# Patient Record
Sex: FEMALE | Race: WHITE | NOT HISPANIC OR LATINO
[De-identification: names, ages, dates, MRNs, and addresses within clinical notes are randomized per-mention and may not be internally consistent; named-entity substitution may affect disease eponyms.]

---

## 2018-01-10 ENCOUNTER — RESULT REVIEW (OUTPATIENT)
Age: 63
End: 2018-01-10

## 2019-01-16 ENCOUNTER — APPOINTMENT (OUTPATIENT)
Dept: OBGYN | Facility: CLINIC | Age: 64
End: 2019-01-16
Payer: COMMERCIAL

## 2019-01-16 VITALS — SYSTOLIC BLOOD PRESSURE: 130 MMHG | HEIGHT: 64 IN | DIASTOLIC BLOOD PRESSURE: 80 MMHG

## 2019-01-16 DIAGNOSIS — Z86.79 PERSONAL HISTORY OF OTHER DISEASES OF THE CIRCULATORY SYSTEM: ICD-10-CM

## 2019-01-16 DIAGNOSIS — Z80.9 FAMILY HISTORY OF MALIGNANT NEOPLASM, UNSPECIFIED: ICD-10-CM

## 2019-01-16 DIAGNOSIS — F41.9 ANXIETY DISORDER, UNSPECIFIED: ICD-10-CM

## 2019-01-16 DIAGNOSIS — Z78.9 OTHER SPECIFIED HEALTH STATUS: ICD-10-CM

## 2019-01-16 DIAGNOSIS — Z82.0 FAMILY HISTORY OF EPILEPSY AND OTHER DISEASES OF THE NERVOUS SYSTEM: ICD-10-CM

## 2019-01-16 DIAGNOSIS — Z86.59 PERSONAL HISTORY OF OTHER MENTAL AND BEHAVIORAL DISORDERS: ICD-10-CM

## 2019-01-16 DIAGNOSIS — Z80.0 FAMILY HISTORY OF MALIGNANT NEOPLASM OF DIGESTIVE ORGANS: ICD-10-CM

## 2019-01-16 DIAGNOSIS — Z82.49 FAMILY HISTORY OF ISCHEMIC HEART DISEASE AND OTHER DISEASES OF THE CIRCULATORY SYSTEM: ICD-10-CM

## 2019-01-16 DIAGNOSIS — Z87.39 PERSONAL HISTORY OF OTHER DISEASES OF THE MUSCULOSKELETAL SYSTEM AND CONNECTIVE TISSUE: ICD-10-CM

## 2019-01-16 DIAGNOSIS — Z80.3 FAMILY HISTORY OF MALIGNANT NEOPLASM OF BREAST: ICD-10-CM

## 2019-01-16 DIAGNOSIS — Z83.6 FAMILY HISTORY OF OTHER DISEASES OF THE RESPIRATORY SYSTEM: ICD-10-CM

## 2019-01-16 DIAGNOSIS — Z00.00 ENCOUNTER FOR GENERAL ADULT MEDICAL EXAMINATION W/OUT ABNORMAL FINDINGS: ICD-10-CM

## 2019-01-16 DIAGNOSIS — Z83.518 FAMILY HISTORY OF OTHER SPECIFIED EYE DISORDER: ICD-10-CM

## 2019-01-16 PROCEDURE — 99396 PREV VISIT EST AGE 40-64: CPT

## 2019-01-16 RX ORDER — SUMATRIPTAN SUCCINATE 100 MG/1
100 TABLET, FILM COATED ORAL
Refills: 0 | Status: ACTIVE | COMMUNITY

## 2019-03-27 ENCOUNTER — RECORD ABSTRACTING (OUTPATIENT)
Age: 64
End: 2019-03-27

## 2019-03-27 DIAGNOSIS — F32.9 MAJOR DEPRESSIVE DISORDER, SINGLE EPISODE, UNSPECIFIED: ICD-10-CM

## 2019-03-27 LAB — CYTOLOGY CVX/VAG DOC THIN PREP: NORMAL

## 2019-03-27 RX ORDER — CALCIUM CITRATE/VITAMIN D3 315MG-6.25
315-200 TABLET ORAL
Refills: 0 | Status: ACTIVE | COMMUNITY

## 2019-06-24 ENCOUNTER — APPOINTMENT (OUTPATIENT)
Dept: GASTROENTEROLOGY | Facility: HOSPITAL | Age: 64
End: 2019-06-24

## 2019-09-08 ENCOUNTER — RX RENEWAL (OUTPATIENT)
Age: 64
End: 2019-09-08

## 2019-09-08 DIAGNOSIS — G43.909 MIGRAINE, UNSPECIFIED, NOT INTRACTABLE, W/OUT STATUS MIGRAINOSUS: ICD-10-CM

## 2019-09-08 DIAGNOSIS — G43.B1: ICD-10-CM

## 2020-01-06 ENCOUNTER — APPOINTMENT (OUTPATIENT)
Dept: OBGYN | Facility: CLINIC | Age: 65
End: 2020-01-06
Payer: MEDICARE

## 2020-01-06 VITALS
HEIGHT: 64 IN | DIASTOLIC BLOOD PRESSURE: 64 MMHG | BODY MASS INDEX: 19.97 KG/M2 | WEIGHT: 117 LBS | SYSTOLIC BLOOD PRESSURE: 110 MMHG

## 2020-01-06 DIAGNOSIS — N60.19 DIFFUSE CYSTIC MASTOPATHY OF UNSPECIFIED BREAST: ICD-10-CM

## 2020-01-06 DIAGNOSIS — Z85.3 PERSONAL HISTORY OF MALIGNANT NEOPLASM OF BREAST: ICD-10-CM

## 2020-01-06 DIAGNOSIS — Z12.31 ENCOUNTER FOR SCREENING MAMMOGRAM FOR MALIGNANT NEOPLASM OF BREAST: ICD-10-CM

## 2020-01-06 PROCEDURE — G0101: CPT

## 2020-01-08 LAB — HPV HIGH+LOW RISK DNA PNL CVX: NOT DETECTED

## 2020-01-09 DIAGNOSIS — R92.2 INCONCLUSIVE MAMMOGRAM: ICD-10-CM

## 2020-01-09 PROBLEM — N60.19 FIBROCYSTIC BREAST DISEASE (FCBD), UNSPECIFIED LATERALITY: Status: ACTIVE | Noted: 2020-01-09

## 2020-01-09 PROBLEM — Z12.31 SCREENING MAMMOGRAM, ENCOUNTER FOR: Status: ACTIVE | Noted: 2020-01-09

## 2020-01-11 LAB — CYTOLOGY CVX/VAG DOC THIN PREP: ABNORMAL

## 2021-01-20 ENCOUNTER — APPOINTMENT (OUTPATIENT)
Dept: OBGYN | Facility: CLINIC | Age: 66
End: 2021-01-20
Payer: MEDICARE

## 2021-01-20 VITALS
HEIGHT: 64 IN | WEIGHT: 124 LBS | DIASTOLIC BLOOD PRESSURE: 70 MMHG | BODY MASS INDEX: 21.17 KG/M2 | SYSTOLIC BLOOD PRESSURE: 120 MMHG

## 2021-01-20 PROCEDURE — 99213 OFFICE O/P EST LOW 20 MIN: CPT

## 2021-01-20 RX ORDER — CHROMIUM 200 MCG
TABLET ORAL
Refills: 0 | Status: ACTIVE | COMMUNITY

## 2021-01-20 RX ORDER — ESTRADIOL 0.5 MG/1
0.5 TABLET ORAL
Refills: 0 | Status: DISCONTINUED | COMMUNITY
End: 2021-01-20

## 2021-01-20 NOTE — HISTORY OF PRESENT ILLNESS
[FreeTextEntry1] : 64yo  postmenopausal without systemic HRT using small amount Estrace cream to introitus/periurethral area in effort to decrease UTIs ( daily x 1 week then 3 weeks off) here for annual Gyn exam. Had laparoscopic BSO 16(sister with ovarian Ca was BRCA + but pt tested BRCA NEGATIVE).  Pt has h/o Atypical Lobular Hyperplasia Right breast(dx'd at time of excisional biopsy for fibroadenoma) sees Dr. Sma q6mo. Has annual mammo and U/S and has MRI annually. Had abnormal right mammo 3/2015 with stereotactic bx 4/8/15-> benign tissue with fibrosis, had excisional biopsy 2015-> benign tissue with fibrosis. Has severe osteopenia, followed by Dr. Grey...failed on Evista rx,unable to take oral bisphosphonates,was treated with IV Boniva,then Prolia with improvement- Restarted Prolia 2017 after 1 year hiatus and is still receiving it. ,Ismael, had prostate Ca recurrence and was treated with cryo. 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 slowly getting better. [Mammogramdate] : 2/3/20 [TextBox_19] : BIRADS 2 [BreastSonogramDate] : 2/3/20 [TextBox_25] : BIRADS 2  MRI 7/30/20 Left BIRADS 1 Right BIRADS 2 [PapSmeardate] : 1/6/20 [TextBox_31] : Neg/HPV Neg [BoneDensityDate] : 9/30/19 [TextBox_37] : T Score Spine-1.7 Hip -1.6 Fem Neck -2.1 [ColonoscopyDate] : 6/24/19 [TextBox_43] : Negative(diverticulosis and hemorrhoids) 7 year recall

## 2022-01-28 ENCOUNTER — NON-APPOINTMENT (OUTPATIENT)
Age: 67
End: 2022-01-28

## 2022-01-31 ENCOUNTER — APPOINTMENT (OUTPATIENT)
Dept: OBGYN | Facility: CLINIC | Age: 67
End: 2022-01-31
Payer: MEDICARE

## 2022-01-31 VITALS
BODY MASS INDEX: 20.83 KG/M2 | HEIGHT: 64 IN | WEIGHT: 122 LBS | SYSTOLIC BLOOD PRESSURE: 112 MMHG | DIASTOLIC BLOOD PRESSURE: 60 MMHG

## 2022-01-31 PROCEDURE — G0101: CPT

## 2022-01-31 RX ORDER — METHOCARBAMOL 750 MG/1
750 TABLET, FILM COATED ORAL
Refills: 0 | Status: DISCONTINUED | COMMUNITY
End: 2022-01-31

## 2022-01-31 RX ORDER — DENOSUMAB 60 MG/ML
INJECTION SUBCUTANEOUS
Refills: 0 | Status: DISCONTINUED | COMMUNITY
End: 2022-01-31

## 2022-01-31 RX ORDER — ACYCLOVIR 800 MG/1
800 TABLET ORAL
Qty: 40 | Refills: 3 | Status: ACTIVE | COMMUNITY
Start: 1900-01-01 | End: 1900-01-01

## 2022-01-31 NOTE — HISTORY OF PRESENT ILLNESS
[FreeTextEntry1] : 67yo  postmenopausal without systemic HRT using small amount Estrace cream to introitus/periurethral area in effort to decrease UTIs ( daily x 1 week then 3 weeks off) here for annual Gyn exam. Had laparoscopic BSO 16(sister with ovarian Ca was BRCA + but pt tested BRCA NEGATIVE). Pt has h/o Atypical Lobular Hyperplasia Right breast(dx'd at time of excisional biopsy for fibroadenoma) sees Dr. Sam q6mo. Has annual mammo and U/S and has MRI annually. Had abnormal right mammo 3/2015 with stereotactic bx 4/8/15-> benign tissue with fibrosis, had excisional biopsy 2015-> benign tissue with fibrosis. Has severe osteopenia, followed by Dr. Grey...failed on Evista rx,unable to take oral bisphosphonates,was treated with IV Boniva,then Prolia with improvement- Restarted Prolia 2017 after 1 year hiatus and is still receiving it... most recent BMD showed significant improvement and plan is to stop Prolia and give 1 Reclast infusion. 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 \par \par \par OB History\par Total Pregnancies: 2, Full Term: 2, Ectopics: 0,  Induced: 0, Premature: 0, Livin,  Spontaneous: 0, Multiple Births: 0. 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 Fernandez ([facility]) [Mammogramdate] : MRI 7/29/21 [TextBox_19] : [facility] BIRADS 1 Mammo 2/24/21 [PapSmeardate] : 1/6/20 [TextBox_31] : Neg/HPV Neg [BoneDensityDate] : 10/11/21 [TextBox_37] : [facility] T Score Spine -1.6  Hip -1.5  (8.5% increase) [ColonoscopyDate] : 6/24/19 [TextBox_43] : Negative(diverticulosis and hemorrhoids) 7 year recall. \par  \par

## 2022-02-07 LAB
CYTOLOGY CVX/VAG DOC THIN PREP: ABNORMAL
HPV HIGH+LOW RISK DNA PNL CVX: NOT DETECTED

## 2023-01-26 ENCOUNTER — TRANSCRIPTION ENCOUNTER (OUTPATIENT)
Age: 68
End: 2023-01-26

## 2023-01-26 ENCOUNTER — NON-APPOINTMENT (OUTPATIENT)
Age: 68
End: 2023-01-26

## 2023-02-06 ENCOUNTER — APPOINTMENT (OUTPATIENT)
Dept: OBGYN | Facility: CLINIC | Age: 68
End: 2023-02-06
Payer: MEDICARE

## 2023-02-06 VITALS
BODY MASS INDEX: 19.97 KG/M2 | DIASTOLIC BLOOD PRESSURE: 83 MMHG | SYSTOLIC BLOOD PRESSURE: 130 MMHG | HEIGHT: 64 IN | WEIGHT: 117 LBS

## 2023-02-06 DIAGNOSIS — N95.2 POSTMENOPAUSAL ATROPHIC VAGINITIS: ICD-10-CM

## 2023-02-06 PROCEDURE — 99214 OFFICE O/P EST MOD 30 MIN: CPT

## 2023-02-06 RX ORDER — DENOSUMAB 60 MG/ML
60 INJECTION SUBCUTANEOUS
Refills: 0 | Status: COMPLETED | COMMUNITY
End: 2023-02-06

## 2023-02-06 RX ORDER — ACETAMINOPHEN, ASPIRIN, AND CAFFEINE 250; 250; 65 MG/1; MG/1; MG/1
250-250-65 TABLET, FILM COATED ORAL
Refills: 0 | Status: COMPLETED | COMMUNITY
End: 2023-02-06

## 2023-02-06 RX ORDER — ESTRADIOL 0.1 MG/G
0.1 CREAM VAGINAL
Qty: 3 | Refills: 1 | Status: ACTIVE | COMMUNITY
Start: 1900-01-01 | End: 1900-01-01

## 2023-02-06 RX ORDER — UBIDECARENONE 200 MG
CAPSULE ORAL
Refills: 0 | Status: COMPLETED | COMMUNITY
End: 2023-02-06

## 2023-02-06 NOTE — HISTORY OF PRESENT ILLNESS
[FreeTextEntry1] : 68yo  postmenopausal without systemic HRT using small amount Estrace cream to introitus/periurethral area whichh has successfully decreased UTIs ( daily x 1 week then 3 weeks off) here for Gyn exam. Had laparoscopic BSO 16(sister with ovarian Ca was BRCA + but pt tested BRCA NEGATIVE). Pt has h/o Atypical Lobular Hyperplasia Right breast(dx'd at time of excisional biopsy for fibroadenoma) sees Dr. Sam q6mo. Has annual mammo and U/S in February and has MRI in July. Had abnormal right mammo 3/2015 with stereotactic bx 4/8/15-> benign tissue with fibrosis, had excisional biopsy 2015-> benign tissue with fibrosis. Has severe osteopenia, followed by Dr. Grey...failed on Evista rx,unable to take oral bisphosphonates,was treated with IV Boniva,then Prolia with improvement- Restarted Prolia 2017 after 1 year hiatus then stopped in  and got 1 Reclast infusion. ,Ismael, had prostate Ca recurrence in lymph node and was treated with cryo successfully then had spot in hip bone adjacent to that lymph node again treated with cryotherapy.\par \par OB History\par Total Pregnancies: 2, Full Term: 2, Ectopics: 0,  Induced: 0, Premature: 0, Livin,  Spontaneous: 0, Multiple Births: 0. 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 Fernandez ([facility])  [Mammogramdate] : 2/7/22 [TextBox_19] : [facility] BIRADS 1 [BreastSonogramDate] : 2/7/22 [TextBox_25] : [facility] BIRADS 1 [PapSmeardate] : 1/31/22 [TextBox_31] : Neg/HPV Neg [BoneDensityDate] : 10/11/21 [TextBox_37] : [facility] T Score Spine -1.6 Hip -1.5 (8.5% increase). [ColonoscopyDate] : 6/24/19 [TextBox_43] : Negative 7 year recall

## 2024-02-06 ENCOUNTER — NON-APPOINTMENT (OUTPATIENT)
Age: 69
End: 2024-02-06

## 2024-02-07 ENCOUNTER — APPOINTMENT (OUTPATIENT)
Dept: OBGYN | Facility: CLINIC | Age: 69
End: 2024-02-07
Payer: MEDICARE

## 2024-02-07 VITALS
SYSTOLIC BLOOD PRESSURE: 130 MMHG | WEIGHT: 120 LBS | BODY MASS INDEX: 20.49 KG/M2 | DIASTOLIC BLOOD PRESSURE: 60 MMHG | HEIGHT: 64 IN

## 2024-02-07 DIAGNOSIS — Z01.419 ENCOUNTER FOR GYNECOLOGICAL EXAMINATION (GENERAL) (ROUTINE) W/OUT ABNORMAL FINDINGS: ICD-10-CM

## 2024-02-07 PROCEDURE — G0101: CPT

## 2024-02-07 RX ORDER — MILK THISTLE 150 MG
500 CAPSULE ORAL
Refills: 0 | Status: ACTIVE | COMMUNITY

## 2024-02-07 RX ORDER — ASCORBIC ACID 125 MG
TABLET,CHEWABLE ORAL
Refills: 0 | Status: ACTIVE | COMMUNITY

## 2024-02-07 RX ORDER — UBIDECARENONE 100 MG
100 CAPSULE ORAL
Refills: 0 | Status: ACTIVE | COMMUNITY

## 2024-02-07 RX ORDER — MULTIVIT-MIN/IRON/FOLIC ACID/K 18-600-40
CAPSULE ORAL
Refills: 0 | Status: DISCONTINUED | COMMUNITY
End: 2024-02-07

## 2024-02-07 RX ORDER — MILK THISTLE 150 MG
CAPSULE ORAL
Refills: 0 | Status: ACTIVE | COMMUNITY

## 2024-02-07 RX ORDER — UBIDECARENONE 50 MG
CAPSULE ORAL
Refills: 0 | Status: ACTIVE | COMMUNITY

## 2024-02-07 RX ORDER — DIAZEPAM 5 MG/1
5 TABLET ORAL
Qty: 2 | Refills: 0 | Status: DISCONTINUED | COMMUNITY
Start: 2023-02-06 | End: 2024-02-07

## 2024-02-07 RX ORDER — ASPIRIN 81 MG/1
81 TABLET, CHEWABLE ORAL
Refills: 0 | Status: DISCONTINUED | COMMUNITY
End: 2024-02-07

## 2024-02-07 RX ORDER — VIT A/VIT C/VIT E/ZINC/COPPER 4296-226
CAPSULE ORAL
Refills: 0 | Status: ACTIVE | COMMUNITY

## 2024-02-07 RX ORDER — PLANT STANOL ESTER 450 MG
TABLET ORAL
Refills: 0 | Status: ACTIVE | COMMUNITY

## 2024-02-07 RX ORDER — DIAZEPAM 5 MG/1
5 TABLET ORAL
Qty: 10 | Refills: 0 | Status: ACTIVE | COMMUNITY
Start: 2019-01-16 | End: 1900-01-01

## 2024-02-07 NOTE — HISTORY OF PRESENT ILLNESS
[FreeTextEntry1] : 69yo  postmenopausal without systemic HRT using small amount Estrace cream to introitus/periurethral area which has successfully decreased UTIs ( daily x 1 week then 3 weeks off) here for Gyn exam. Had laparoscopic BSO 16(sister with ovarian Ca was BRCA + but pt tested BRCA NEGATIVE). Pt has h/o Atypical Lobular Hyperplasia Right breast(dx'd at time of excisional biopsy for fibroadenoma) sees Dr. Sam q6mo. Has annual mammo and U/S in February and has MRI in August. Had abnormal right mammo 3/2015 with stereotactic bx 4/8/15-> benign tissue with fibrosis, had excisional biopsy 2015-> benign tissue with fibrosis. Has severe osteopenia, followed by (endocrinologist)..failed on Evista rx,unable to take oral bisphosphonates,was treated with IV Boniva,then Prolia with improvement- Restarted Prolia 2017 after 1 year hiatus then stopped in  and got 1 Reclast infusion but BMD declined therefore she is restarting Prolia now. 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    OB History Total Pregnancies: 2, Full Term: 2, Ectopics: 0,  Induced: 0, Premature: 0, Livin,  Spontaneous: 0, Multiple Births: 0. Past Pregnancies: Vaginal delivery boy. Zander Vaginal delivery girl. Rebecca (Karlene)  Preventative Visit:   Mammogram: 23, [facility] BIRADS 1.     Breast Sonogram: 23, [facility] BIRADS 1.  Breast MRI: 23 [facility] BIRADS 1 PAP Smear: 22, Neg/HPV Neg.   Bone Density: 23 [facility] T Score Spine -3.1 Hip -2.1 significant decrease Colonoscopy: 19, Negative 7 year recall.

## 2024-02-08 LAB — HPV HIGH+LOW RISK DNA PNL CVX: NOT DETECTED

## 2024-02-09 ENCOUNTER — NON-APPOINTMENT (OUTPATIENT)
Age: 69
End: 2024-02-09

## 2024-02-15 LAB — CYTOLOGY CVX/VAG DOC THIN PREP: ABNORMAL

## 2024-02-23 ENCOUNTER — RX RENEWAL (OUTPATIENT)
Age: 69
End: 2024-02-23

## 2024-02-23 RX ORDER — SUMATRIPTAN 100 MG/1
100 TABLET, FILM COATED ORAL
Qty: 9 | Refills: 11 | Status: ACTIVE | COMMUNITY
Start: 2019-09-08 | End: 1900-01-01

## 2025-02-14 ENCOUNTER — APPOINTMENT (OUTPATIENT)
Dept: OBGYN | Facility: CLINIC | Age: 70
End: 2025-02-14
Payer: MEDICARE

## 2025-02-14 VITALS
WEIGHT: 109 LBS | BODY MASS INDEX: 18.61 KG/M2 | HEIGHT: 64 IN | DIASTOLIC BLOOD PRESSURE: 88 MMHG | SYSTOLIC BLOOD PRESSURE: 148 MMHG

## 2025-02-14 DIAGNOSIS — Z91.89 OTHER SPECIFIED PERSONAL RISK FACTORS, NOT ELSEWHERE CLASSIFIED: ICD-10-CM

## 2025-02-14 DIAGNOSIS — N95.2 POSTMENOPAUSAL ATROPHIC VAGINITIS: ICD-10-CM

## 2025-02-14 PROCEDURE — 99214 OFFICE O/P EST MOD 30 MIN: CPT

## 2025-02-14 RX ORDER — CLINDAMYCIN HYDROCHLORIDE 300 MG/1
300 CAPSULE ORAL
Qty: 21 | Refills: 0 | Status: COMPLETED | COMMUNITY
Start: 2024-10-15

## 2025-02-14 RX ORDER — TRETINOIN 0.25 MG/G
0.03 CREAM TOPICAL
Qty: 20 | Refills: 0 | Status: ACTIVE | COMMUNITY
Start: 2025-02-06

## 2025-02-14 RX ORDER — PREDNISONE 20 MG/1
20 TABLET ORAL
Qty: 10 | Refills: 0 | Status: ACTIVE | COMMUNITY
Start: 2025-01-27

## 2025-02-14 RX ORDER — AZITHROMYCIN 250 MG/1
250 TABLET, FILM COATED ORAL
Qty: 6 | Refills: 0 | Status: COMPLETED | COMMUNITY
Start: 2024-11-26

## 2025-02-14 RX ORDER — MELOXICAM 7.5 MG/1
7.5 TABLET ORAL
Qty: 14 | Refills: 0 | Status: ACTIVE | COMMUNITY
Start: 2024-10-01

## 2025-02-14 RX ORDER — ALPRAZOLAM 0.25 MG/1
0.25 TABLET ORAL
Qty: 30 | Refills: 0 | Status: ACTIVE | COMMUNITY
Start: 2024-11-08

## 2025-02-14 RX ORDER — FLUCONAZOLE 200 MG/1
200 TABLET ORAL
Qty: 1 | Refills: 0 | Status: ACTIVE | COMMUNITY
Start: 2024-10-17